# Patient Record
Sex: FEMALE | Race: WHITE | Employment: FULL TIME | URBAN - METROPOLITAN AREA
[De-identification: names, ages, dates, MRNs, and addresses within clinical notes are randomized per-mention and may not be internally consistent; named-entity substitution may affect disease eponyms.]

---

## 2017-08-29 ENCOUNTER — HOSPITAL ENCOUNTER (OUTPATIENT)
Age: 57
Discharge: OTHER INSTITUTION | End: 2017-08-29
Attending: FAMILY MEDICINE
Payer: COMMERCIAL

## 2017-08-29 ENCOUNTER — APPOINTMENT (OUTPATIENT)
Dept: GENERAL RADIOLOGY | Age: 57
End: 2017-08-29
Attending: FAMILY MEDICINE
Payer: COMMERCIAL

## 2017-08-29 VITALS
HEART RATE: 93 BPM | RESPIRATION RATE: 18 BRPM | DIASTOLIC BLOOD PRESSURE: 88 MMHG | OXYGEN SATURATION: 100 % | SYSTOLIC BLOOD PRESSURE: 165 MMHG | TEMPERATURE: 97 F

## 2017-08-29 DIAGNOSIS — R07.9 ACUTE CHEST PAIN: ICD-10-CM

## 2017-08-29 DIAGNOSIS — R94.31 ABNORMAL EKG: Primary | ICD-10-CM

## 2017-08-29 PROCEDURE — 99204 OFFICE O/P NEW MOD 45 MIN: CPT

## 2017-08-29 PROCEDURE — 93005 ELECTROCARDIOGRAM TRACING: CPT

## 2017-08-29 PROCEDURE — 93010 ELECTROCARDIOGRAM REPORT: CPT

## 2017-08-29 PROCEDURE — 93010 ELECTROCARDIOGRAM REPORT: CPT | Performed by: FAMILY MEDICINE

## 2017-08-29 PROCEDURE — 71020 XR CHEST PA + LAT CHEST (CPT=71020): CPT | Performed by: FAMILY MEDICINE

## 2017-08-29 RX ORDER — ASPIRIN 81 MG/1
324 TABLET, CHEWABLE ORAL ONCE
Status: COMPLETED | OUTPATIENT
Start: 2017-08-29 | End: 2017-08-29

## 2017-08-29 RX ORDER — DULOXETIN HYDROCHLORIDE 30 MG/1
20 CAPSULE, DELAYED RELEASE ORAL DAILY
COMMUNITY
End: 2017-12-19

## 2017-08-29 RX ORDER — LAMOTRIGINE 100 MG/1
100 TABLET ORAL DAILY
COMMUNITY
End: 2018-04-30 | Stop reason: DRUGHIGH

## 2017-08-29 NOTE — ED INITIAL ASSESSMENT (HPI)
Patient comes in complaining of difficulty breathing and pain in her chest. She is under a lot of stress at work with a family history of cardiovascular disease. She also has a history of anxiety.

## 2017-08-29 NOTE — ED NOTES
Patient in need of higher level of care. 911 phoned. Patient stable. She will be taken to [facility] by EMS.

## 2017-08-29 NOTE — ED PROVIDER NOTES
Patient Seen in: [address]    History   Patient presents with:  Chest Pain Angina (cardiovascular)    Stated Complaint: SOB; CHEST PAIN    HPI  61-year-old female with a past medical history significant for depression a is oriented to person, place, and time. She appears well-developed and well-nourished. She appears distressed. Eyes: Conjunctivae are normal. Pupils are equal, round, and reactive to light. Neck: Neck supple.    Cardiovascular: Normal rate, regular rhyt degenerative changes in thoracic spine.   OTHER: Negative.       Dictated by (CST): Patt Olszewski, MD on 8/29/2017 at 12:53       Approved by (CST): Patt Olszewski, MD on 8/29/2017 at 12:54                   Disposition and Plan     Clinical Impression:  MJ

## 2017-12-19 ENCOUNTER — OFFICE VISIT (OUTPATIENT)
Dept: FAMILY MEDICINE CLINIC | Facility: CLINIC | Age: 57
End: 2017-12-19

## 2017-12-19 VITALS
WEIGHT: 166 LBS | DIASTOLIC BLOOD PRESSURE: 70 MMHG | HEIGHT: 64 IN | HEART RATE: 89 BPM | SYSTOLIC BLOOD PRESSURE: 116 MMHG | BODY MASS INDEX: 28.34 KG/M2 | OXYGEN SATURATION: 99 %

## 2017-12-19 DIAGNOSIS — M99.00 SOMATIC DYSFUNCTION OF HEAD REGION: ICD-10-CM

## 2017-12-19 DIAGNOSIS — Z12.31 VISIT FOR SCREENING MAMMOGRAM: ICD-10-CM

## 2017-12-19 DIAGNOSIS — M54.2 CERVICAL PAIN (NECK): Primary | ICD-10-CM

## 2017-12-19 DIAGNOSIS — M25.572 CHRONIC PAIN OF LEFT ANKLE: ICD-10-CM

## 2017-12-19 DIAGNOSIS — M99.01 SOMATIC DYSFUNCTION OF CERVICAL REGION: ICD-10-CM

## 2017-12-19 DIAGNOSIS — G89.29 CHRONIC PAIN OF LEFT ANKLE: ICD-10-CM

## 2017-12-19 PROCEDURE — 99202 OFFICE O/P NEW SF 15 MIN: CPT | Performed by: FAMILY MEDICINE

## 2017-12-19 PROCEDURE — 98925 OSTEOPATH MANJ 1-2 REGIONS: CPT | Performed by: FAMILY MEDICINE

## 2017-12-19 RX ORDER — DULOXETIN HYDROCHLORIDE 20 MG/1
CAPSULE, DELAYED RELEASE ORAL
COMMUNITY
Start: 2017-12-04 | End: 2018-04-30 | Stop reason: DRUGHIGH

## 2017-12-19 NOTE — PROGRESS NOTES
CC:  Patient presents with:  New Patient  Ankle Pain: left ankle injured many year ago and gets pain since then on and off  Neck Pain: started after an accident about 4 years ago      HPI: 56year old female here to discuss chronic left ankle and neck pain Not on file    Drug use: Unknown     Other Topics Concern   None on file     Social History Narrative   None on file         Current Outpatient Prescriptions:  DULoxetine HCl 20 MG Oral Cap DR Particles  Disp:  Rfl:    lamoTRIgine 100 MG Oral Tab Take 100 Future    Harrison Sides, DO  12/19/17  10:13 AM

## 2017-12-20 NOTE — PROCEDURES
Osteopathic Manipulation Procedure Note     Head region:  Suboccipital release of head region performed with moderate improvement noted, no complications.     Cervical region:  Soft tissue of cervical paraspinal musculature performed with moderate improvem

## 2018-01-17 ENCOUNTER — MED REC SCAN ONLY (OUTPATIENT)
Dept: FAMILY MEDICINE CLINIC | Facility: CLINIC | Age: 58
End: 2018-01-17

## 2018-04-30 ENCOUNTER — OFFICE VISIT (OUTPATIENT)
Dept: FAMILY MEDICINE CLINIC | Facility: CLINIC | Age: 58
End: 2018-04-30

## 2018-04-30 VITALS
SYSTOLIC BLOOD PRESSURE: 122 MMHG | OXYGEN SATURATION: 98 % | WEIGHT: 156 LBS | HEART RATE: 77 BPM | HEIGHT: 64 IN | DIASTOLIC BLOOD PRESSURE: 68 MMHG | BODY MASS INDEX: 26.63 KG/M2

## 2018-04-30 DIAGNOSIS — K21.9 GASTROESOPHAGEAL REFLUX DISEASE, ESOPHAGITIS PRESENCE NOT SPECIFIED: ICD-10-CM

## 2018-04-30 DIAGNOSIS — R19.5 CHANGE IN STOOL CALIBER: ICD-10-CM

## 2018-04-30 DIAGNOSIS — R14.0 ABDOMINAL BLOATING: Primary | ICD-10-CM

## 2018-04-30 DIAGNOSIS — R09.81 NASAL CONGESTION: ICD-10-CM

## 2018-04-30 PROBLEM — G47.33 OBSTRUCTIVE SLEEP APNEA: Status: ACTIVE | Noted: 2018-04-30

## 2018-04-30 PROCEDURE — 99214 OFFICE O/P EST MOD 30 MIN: CPT | Performed by: FAMILY MEDICINE

## 2018-04-30 RX ORDER — FLUTICASONE PROPIONATE 50 MCG
2 SPRAY, SUSPENSION (ML) NASAL DAILY
Qty: 1 INHALER | Refills: 3 | Status: SHIPPED | OUTPATIENT
Start: 2018-04-30 | End: 2019-04-11

## 2018-04-30 RX ORDER — DULOXETIN HYDROCHLORIDE 30 MG/1
10 CAPSULE, DELAYED RELEASE ORAL DAILY
COMMUNITY
Start: 2018-04-07

## 2018-04-30 RX ORDER — LAMOTRIGINE 150 MG/1
150 TABLET ORAL DAILY
COMMUNITY
Start: 2018-04-07

## 2018-04-30 NOTE — PROGRESS NOTES
CC:  Patient presents with:   Other: OMT-for her back and neck   Fibroid: states she would like to check on her fibroid- having bloating and digestive problems which she had in the past due to the size of her fibroid  Nasal Congestion: wakes up every mornin diarrhea, acid reflux, no abdominal pain but abdominal bloating and fullness, gas, no hematochezia or melena, thinner stools   :  No discharge, no dysuria, polyuria, no hematuria, no vaginal bleeding   Endo: polyuria and polydipsia   Dermatologic:  No ra lesions    Assessment and Plan: 56year old female here with concerns for abdominal bloating/fullness, nasal congestion, and worsening reflux.     1. Abdominal bloating    - Will obtain pelvic ultrasound for further evaluation of possible uterine or ovarian

## 2018-04-30 NOTE — PATIENT INSTRUCTIONS
Step-by-Step  Using Nasal Spray    Date Last Reviewed: 5/27/2015  © 8980-4885 [employer]. [address] [facility], [address] [place] [address]. All rights reserved. This information is not intended as a substitute for professional medical care.  [place]

## 2018-05-02 ENCOUNTER — HOSPITAL ENCOUNTER (OUTPATIENT)
Dept: ULTRASOUND IMAGING | Age: 58
Discharge: HOME | End: 2018-05-02
Attending: FAMILY MEDICINE
Payer: COMMERCIAL

## 2018-05-02 DIAGNOSIS — R14.0 ABDOMINAL BLOATING: ICD-10-CM

## 2018-05-02 PROCEDURE — 76856 US EXAM PELVIC COMPLETE: CPT | Performed by: FAMILY MEDICINE

## 2018-05-02 PROCEDURE — 76830 TRANSVAGINAL US NON-OB: CPT | Performed by: FAMILY MEDICINE

## 2019-04-09 ENCOUNTER — TELEPHONE (OUTPATIENT)
Dept: FAMILY MEDICINE CLINIC | Facility: CLINIC | Age: 59
End: 2019-04-09

## 2019-04-09 DIAGNOSIS — Z12.39 SCREENING BREAST EXAMINATION: Primary | ICD-10-CM

## 2019-04-11 ENCOUNTER — OFFICE VISIT (OUTPATIENT)
Dept: FAMILY MEDICINE CLINIC | Facility: CLINIC | Age: 59
End: 2019-04-11
Payer: COMMERCIAL

## 2019-04-11 VITALS
WEIGHT: 151.63 LBS | HEIGHT: 64 IN | BODY MASS INDEX: 25.89 KG/M2 | OXYGEN SATURATION: 98 % | HEART RATE: 74 BPM | SYSTOLIC BLOOD PRESSURE: 118 MMHG | DIASTOLIC BLOOD PRESSURE: 62 MMHG

## 2019-04-11 DIAGNOSIS — D22.9 MULTIPLE NEVI: ICD-10-CM

## 2019-04-11 DIAGNOSIS — Z12.31 SCREENING MAMMOGRAM, ENCOUNTER FOR: ICD-10-CM

## 2019-04-11 DIAGNOSIS — Z00.00 ENCOUNTER FOR ROUTINE ADULT HEALTH EXAMINATION WITHOUT ABNORMAL FINDINGS: Primary | ICD-10-CM

## 2019-04-11 DIAGNOSIS — Z11.59 ENCOUNTER FOR HEPATITIS C SCREENING TEST FOR LOW RISK PATIENT: ICD-10-CM

## 2019-04-11 PROCEDURE — 99396 PREV VISIT EST AGE 40-64: CPT | Performed by: FAMILY MEDICINE

## 2019-04-11 NOTE — PROGRESS NOTES
HPI:   Domenico Mendez is a 66year old female who presents for a complete physical exam.     Last pap: 5/2017 and normal   Last mammogram: Uncertain but thinks it was 4 years ago and normal. Told to do both mammogram and ultrasound due to density of her Procedure Laterality Date   • ARTHROTOMY,OPEN REPAIR MENISCUS Right    • MOHS - REFERRAL      For basal cell      Family History   Problem Relation Age of Onset   • Stroke Mother    • Heart Attack Father 52   • Breast Cancer Sister    • Diabetes Maternal (Glycohemoglobin) [E]    Will see dermatology for routine skin check given history of basal cell carcinoma, multiple nevi, and family history of skin cancer, though no abnormal appearing moles today.      Discussed with patient the following:  -Zena spaulding

## 2019-04-11 NOTE — PATIENT INSTRUCTIONS
Prevention Guidelines, Women Ages 48 to 59  Screening tests and vaccines are an important part of managing your health. A screening test is done to find possible disorders or diseases in people who don't have any symptoms.  The goal is to find a disease e Chlamydia Women at increased risk for infection At routine exams   Colorectal cancer All women in this age group Flexible sigmoidoscopy every 5 years, or colonoscopy every 10 years, or double-contrast barium enema every 5 years; yearly fecal occult blood t Hepatitis B Women at increased risk for infection – talk with your healthcare provider 3 doses over 6 months; second dose should be given 1 month after the first dose; the third dose should be given at least 2 months after the second dose and at least 4 mo Use of daily aspirin Women ages 54 and up in this age group who are at risk for cardiovascular health problems such as stroke When your risk is known   Use of tobacco and the health effects it can cause All women in this age group Every exam   1[employer]merpr[employer] Ca

## 2019-04-12 ENCOUNTER — HOSPITAL ENCOUNTER (OUTPATIENT)
Dept: MAMMOGRAPHY | Age: 59
Discharge: HOME | End: 2019-04-12
Attending: FAMILY MEDICINE
Payer: COMMERCIAL

## 2019-04-12 ENCOUNTER — LAB ENCOUNTER (OUTPATIENT)
Dept: LAB | Facility: REFERENCE LAB | Age: 59
End: 2019-04-12
Attending: FAMILY MEDICINE
Payer: COMMERCIAL

## 2019-04-12 DIAGNOSIS — Z12.31 SCREENING MAMMOGRAM, ENCOUNTER FOR: ICD-10-CM

## 2019-04-12 DIAGNOSIS — Z11.59 ENCOUNTER FOR HEPATITIS C SCREENING TEST FOR LOW RISK PATIENT: ICD-10-CM

## 2019-04-12 DIAGNOSIS — Z00.00 ENCOUNTER FOR ROUTINE ADULT HEALTH EXAMINATION WITHOUT ABNORMAL FINDINGS: ICD-10-CM

## 2019-04-12 PROCEDURE — 80053 COMPREHEN METABOLIC PANEL: CPT

## 2019-04-12 PROCEDURE — 80061 LIPID PANEL: CPT

## 2019-04-12 PROCEDURE — 77063 BREAST TOMOSYNTHESIS BI: CPT | Performed by: FAMILY MEDICINE

## 2019-04-12 PROCEDURE — 83036 HEMOGLOBIN GLYCOSYLATED A1C: CPT

## 2019-04-12 PROCEDURE — 86803 HEPATITIS C AB TEST: CPT

## 2019-04-12 PROCEDURE — 85025 COMPLETE CBC W/AUTO DIFF WBC: CPT

## 2019-04-12 PROCEDURE — 77067 SCR MAMMO BI INCL CAD: CPT | Performed by: FAMILY MEDICINE

## 2019-04-12 PROCEDURE — 36415 COLL VENOUS BLD VENIPUNCTURE: CPT

## 2019-04-13 DIAGNOSIS — D72.819 LEUKOPENIA, UNSPECIFIED TYPE: Primary | ICD-10-CM

## 2019-05-02 ENCOUNTER — HOSPITAL ENCOUNTER (OUTPATIENT)
Dept: ULTRASOUND IMAGING | Facility: HOSPITAL | Age: 59
Discharge: HOME | End: 2019-05-02
Attending: FAMILY MEDICINE
Payer: COMMERCIAL

## 2019-05-02 ENCOUNTER — HOSPITAL ENCOUNTER (OUTPATIENT)
Dept: MAMMOGRAPHY | Facility: HOSPITAL | Age: 59
Discharge: HOME | End: 2019-05-02
Attending: FAMILY MEDICINE
Payer: COMMERCIAL

## 2019-05-02 DIAGNOSIS — R92.8 ABNORMAL MAMMOGRAM: ICD-10-CM

## 2019-05-02 DIAGNOSIS — R92.8 ABNORMAL MAMMOGRAM: Primary | ICD-10-CM

## 2019-05-02 PROCEDURE — 77062 BREAST TOMOSYNTHESIS BI: CPT | Performed by: FAMILY MEDICINE

## 2019-05-02 PROCEDURE — 77066 DX MAMMO INCL CAD BI: CPT | Performed by: FAMILY MEDICINE

## 2019-05-02 PROCEDURE — 76642 ULTRASOUND BREAST LIMITED: CPT | Performed by: FAMILY MEDICINE

## 2019-09-25 ENCOUNTER — PATIENT MESSAGE (OUTPATIENT)
Dept: FAMILY MEDICINE CLINIC | Facility: CLINIC | Age: 59
End: 2019-09-25

## 2019-09-25 DIAGNOSIS — E55.9 VITAMIN D DEFICIENCY: Primary | ICD-10-CM

## 2019-09-27 NOTE — TELEPHONE ENCOUNTER
From: Edith Mancera  To: Francisca Britton DO  Sent: 9/25/2019 12:13 PM CDT  Subject: Non-Urgent Medical Question    Hi again,     I am supposed to come back to get my white blood cell count checked, per Dr. Elly Pena.  I am wondering if I can at the same time ge

## 2020-07-14 ENCOUNTER — TELEPHONE (OUTPATIENT)
Dept: FAMILY MEDICINE CLINIC | Facility: CLINIC | Age: 60
End: 2020-07-14

## 2020-07-14 NOTE — TELEPHONE ENCOUNTER
Pt is needing refills and she also has questions about her mammogram     DULoxetine HCl 30 MG Oral Cap DR Particles      lamoTRIgine 150 MG Oral Tab    [id number] [place], [place] - [facility] [address] AT [address] [place] [address], [phone],

## 2020-07-14 NOTE — TELEPHONE ENCOUNTER
Left voice message to call office to schedule an appointment due Dr. Mary Collier on maternity leave and Dr. Pineda/ [facility] needing to see pt before providing refills.     Last visit 04/11/2019 AS     Return in about 1 year (around 4/11/2020), or if symptoms worse

## 2020-07-15 ENCOUNTER — OFFICE VISIT (OUTPATIENT)
Dept: FAMILY MEDICINE CLINIC | Facility: CLINIC | Age: 60
End: 2020-07-15
Payer: COMMERCIAL

## 2020-07-15 VITALS
DIASTOLIC BLOOD PRESSURE: 74 MMHG | HEIGHT: 64 IN | WEIGHT: 151 LBS | OXYGEN SATURATION: 98 % | HEART RATE: 84 BPM | TEMPERATURE: 98 F | SYSTOLIC BLOOD PRESSURE: 120 MMHG | BODY MASS INDEX: 25.78 KG/M2

## 2020-07-15 DIAGNOSIS — Z00.00 PHYSICAL EXAM, ANNUAL: Primary | ICD-10-CM

## 2020-07-15 DIAGNOSIS — D25.9 UTERINE LEIOMYOMA, UNSPECIFIED LOCATION: ICD-10-CM

## 2020-07-15 DIAGNOSIS — Z13.1 DIABETES MELLITUS SCREENING: ICD-10-CM

## 2020-07-15 DIAGNOSIS — Z12.4 PAP SMEAR FOR CERVICAL CANCER SCREENING: ICD-10-CM

## 2020-07-15 DIAGNOSIS — N63.20 LEFT BREAST MASS: ICD-10-CM

## 2020-07-15 DIAGNOSIS — E78.00 PURE HYPERCHOLESTEROLEMIA: ICD-10-CM

## 2020-07-15 PROCEDURE — 87624 HPV HI-RISK TYP POOLED RSLT: CPT | Performed by: FAMILY MEDICINE

## 2020-07-15 PROCEDURE — 3008F BODY MASS INDEX DOCD: CPT | Performed by: FAMILY MEDICINE

## 2020-07-15 PROCEDURE — 99396 PREV VISIT EST AGE 40-64: CPT | Performed by: FAMILY MEDICINE

## 2020-07-15 PROCEDURE — 88175 CYTOPATH C/V AUTO FLUID REDO: CPT | Performed by: FAMILY MEDICINE

## 2020-07-15 PROCEDURE — 3074F SYST BP LT 130 MM HG: CPT | Performed by: FAMILY MEDICINE

## 2020-07-15 PROCEDURE — 3078F DIAST BP <80 MM HG: CPT | Performed by: FAMILY MEDICINE

## 2020-07-15 NOTE — PROGRESS NOTES
HPI:   Fabiola Kay is a 64year old female who presents for a complete physical exam and pelvic protrusion. Feels protrusion of entire lower pelvic area  Has hx of fibroids. Fibroids run in family. No pain. No GI or  symptoms.   LMP was about Depression    • Fibroid    • RAFAEL (obstructive sleep apnea)    • Shingles    • Sigmoiditis     Right foot      Past Surgical History:   Procedure Laterality Date   • ARTHROTOMY,OPEN REPAIR MENISCUS Right    • MOHS - REFERRAL      For basal cell      Family ASSESSMENT AND PLAN:   Ava Eddy is a 64year old female who presents for a complete physical exam.  Physical exam, annual  (primary encounter diagnosis)  Pap smear for cervical cancer screening  Left breast mass  Uterine leiomyoma, unspecified lo BILAT (CPT=77066/96135)  US BREAST LEFT COMPLETE (FRM=23930)  US PELVIS (TRANSVAGINAL PELVIS) (QWN=36028)    Shraddha Zamudio DO  7/15/2020  2:05 PM

## 2020-07-17 LAB — HPV I/H RISK 1 DNA SPEC QL NAA+PROBE: NEGATIVE

## 2020-09-04 ENCOUNTER — TELEPHONE (OUTPATIENT)
Dept: FAMILY MEDICINE CLINIC | Facility: CLINIC | Age: 60
End: 2020-09-04

## 2020-09-04 NOTE — TELEPHONE ENCOUNTER
Pt had LOV with MP on 7/15/2020; pt denies any symptoms but believes she had covid19 a few weeks ago and would like to get tested for covid19 antibodies. Request sent to [facility]. Pt to expect call back tomorrow or Tuesday.  Pt verbalized understanding and agrees

## 2020-09-09 NOTE — TELEPHONE ENCOUNTER
Called and LVM for third time. Encouraged patient to schedule appt to discuss utility of antibody testing.

## 2020-10-16 ENCOUNTER — OFFICE VISIT (OUTPATIENT)
Dept: FAMILY MEDICINE CLINIC | Facility: CLINIC | Age: 60
End: 2020-10-16
Payer: COMMERCIAL

## 2020-10-16 VITALS
HEART RATE: 84 BPM | WEIGHT: 151 LBS | HEIGHT: 64 IN | OXYGEN SATURATION: 98 % | DIASTOLIC BLOOD PRESSURE: 76 MMHG | BODY MASS INDEX: 25.78 KG/M2 | SYSTOLIC BLOOD PRESSURE: 120 MMHG | TEMPERATURE: 97 F

## 2020-10-16 DIAGNOSIS — R41.840 INATTENTION: ICD-10-CM

## 2020-10-16 DIAGNOSIS — Z20.822 ENCOUNTER FOR LABORATORY TESTING FOR COVID-19 VIRUS: Primary | ICD-10-CM

## 2020-10-16 DIAGNOSIS — R41.89 BRAIN FOG: ICD-10-CM

## 2020-10-16 DIAGNOSIS — M25.50 ARTHRALGIA, UNSPECIFIED JOINT: ICD-10-CM

## 2020-10-16 PROCEDURE — 3078F DIAST BP <80 MM HG: CPT | Performed by: FAMILY MEDICINE

## 2020-10-16 PROCEDURE — 90686 IIV4 VACC NO PRSV 0.5 ML IM: CPT | Performed by: FAMILY MEDICINE

## 2020-10-16 PROCEDURE — 90471 IMMUNIZATION ADMIN: CPT | Performed by: FAMILY MEDICINE

## 2020-10-16 PROCEDURE — 3008F BODY MASS INDEX DOCD: CPT | Performed by: FAMILY MEDICINE

## 2020-10-16 PROCEDURE — 3074F SYST BP LT 130 MM HG: CPT | Performed by: FAMILY MEDICINE

## 2020-10-16 PROCEDURE — 99214 OFFICE O/P EST MOD 30 MIN: CPT | Performed by: FAMILY MEDICINE

## 2020-10-16 NOTE — PROGRESS NOTES
HPI:    Patient ID: Ava Sender is a 64year old female who presents for few concerns. HPI  Would like covid Ab testing. Thinks she had covid in early April. Takes lamictal and cymbalta for bipolar II.    Recently has been having a hard time co Head: Normocephalic and atraumatic. Eyes: Conjunctivae and EOM are normal. Right eye exhibits no discharge. Left eye exhibits no discharge. No scleral icterus. Neck: Neck supple. No JVD present. No thyromegaly present.    Cardiovascular: Normal rate,

## 2020-10-19 ENCOUNTER — LAB ENCOUNTER (OUTPATIENT)
Dept: LAB | Age: 60
End: 2020-10-19
Attending: FAMILY MEDICINE
Payer: COMMERCIAL

## 2020-10-19 DIAGNOSIS — M25.50 ARTHRALGIA, UNSPECIFIED JOINT: ICD-10-CM

## 2020-10-19 DIAGNOSIS — Z20.822 ENCOUNTER FOR LABORATORY TESTING FOR COVID-19 VIRUS: ICD-10-CM

## 2020-10-19 PROCEDURE — 85027 COMPLETE CBC AUTOMATED: CPT | Performed by: FAMILY MEDICINE

## 2020-10-19 PROCEDURE — 86769 SARS-COV-2 COVID-19 ANTIBODY: CPT

## 2020-10-19 PROCEDURE — 80053 COMPREHEN METABOLIC PANEL: CPT | Performed by: FAMILY MEDICINE

## 2020-10-19 PROCEDURE — 85652 RBC SED RATE AUTOMATED: CPT

## 2020-10-19 PROCEDURE — 36415 COLL VENOUS BLD VENIPUNCTURE: CPT | Performed by: FAMILY MEDICINE

## 2020-10-19 PROCEDURE — 80061 LIPID PANEL: CPT | Performed by: FAMILY MEDICINE

## 2020-10-19 PROCEDURE — 82306 VITAMIN D 25 HYDROXY: CPT | Performed by: FAMILY MEDICINE

## 2020-10-19 PROCEDURE — 83036 HEMOGLOBIN GLYCOSYLATED A1C: CPT | Performed by: FAMILY MEDICINE

## 2020-10-19 PROCEDURE — 86140 C-REACTIVE PROTEIN: CPT

## 2020-10-22 DIAGNOSIS — E55.9 VITAMIN D DEFICIENCY: Primary | ICD-10-CM

## 2020-10-22 PROBLEM — E78.2 MIXED HYPERLIPIDEMIA: Status: ACTIVE | Noted: 2020-10-22

## 2020-11-05 ENCOUNTER — LAB ENCOUNTER (OUTPATIENT)
Dept: LAB | Age: 60
End: 2020-11-05
Attending: FAMILY MEDICINE
Payer: COMMERCIAL

## 2020-11-05 DIAGNOSIS — R41.89 BRAIN FOG: ICD-10-CM

## 2020-11-05 PROCEDURE — 82607 VITAMIN B-12: CPT

## 2020-11-05 PROCEDURE — 36415 COLL VENOUS BLD VENIPUNCTURE: CPT

## 2020-11-05 PROCEDURE — 82746 ASSAY OF FOLIC ACID SERUM: CPT

## 2020-11-16 ENCOUNTER — OFFICE VISIT (OUTPATIENT)
Dept: FAMILY MEDICINE CLINIC | Facility: CLINIC | Age: 60
End: 2020-11-16
Payer: COMMERCIAL

## 2020-11-16 VITALS
DIASTOLIC BLOOD PRESSURE: 62 MMHG | BODY MASS INDEX: 26.12 KG/M2 | OXYGEN SATURATION: 98 % | HEIGHT: 64 IN | SYSTOLIC BLOOD PRESSURE: 118 MMHG | HEART RATE: 75 BPM | WEIGHT: 153 LBS

## 2020-11-16 DIAGNOSIS — M99.02 SOMATIC DYSFUNCTION OF THORACIC REGION: ICD-10-CM

## 2020-11-16 DIAGNOSIS — M99.01 SOMATIC DYSFUNCTION OF CERVICAL REGION: ICD-10-CM

## 2020-11-16 DIAGNOSIS — M99.00 SOMATIC DYSFUNCTION OF HEAD REGION: Primary | ICD-10-CM

## 2020-11-16 DIAGNOSIS — M99.07 SEGMENTAL DYSFUNCTION OF UPPER EXTREMITY: ICD-10-CM

## 2020-11-16 PROCEDURE — 3008F BODY MASS INDEX DOCD: CPT | Performed by: FAMILY MEDICINE

## 2020-11-16 PROCEDURE — 3074F SYST BP LT 130 MM HG: CPT | Performed by: FAMILY MEDICINE

## 2020-11-16 PROCEDURE — 3078F DIAST BP <80 MM HG: CPT | Performed by: FAMILY MEDICINE

## 2020-11-16 PROCEDURE — 98926 OSTEOPATH MANJ 3-4 REGIONS: CPT | Performed by: FAMILY MEDICINE

## 2020-11-16 NOTE — PROGRESS NOTES
CC:  Patient presents with:  Neck Pain: needs OMT      HPI: 64year old female here for OMT of her neck, shoulders, and feet.    Reports her neck has been really locked up and she fell a few weeks ago after tripping on the sidewalk and landed on her right e Attends [employer] service: Not on file        Active member of club or organization: Not on file        Attends meetings of clubs or organizations: Not on file        Relationship status: Not on file      Intimate partner violence        Fear of curre DO Anastacio  11/16/20  1:49 PM

## 2020-11-17 NOTE — PROCEDURES
Osteopathic Manipulation Procedure Note     Head region:  Suboccipital release of head region performed with improvement noted, no complications.     Cervical region:  Soft tissue of cervical paraspinal musculature performed with moderate improvement noted

## 2021-07-12 ENCOUNTER — OFFICE VISIT (OUTPATIENT)
Dept: FAMILY MEDICINE CLINIC | Facility: CLINIC | Age: 61
End: 2021-07-12
Payer: COMMERCIAL

## 2021-07-12 ENCOUNTER — LAB ENCOUNTER (OUTPATIENT)
Dept: LAB | Facility: REFERENCE LAB | Age: 61
End: 2021-07-12
Attending: FAMILY MEDICINE
Payer: COMMERCIAL

## 2021-07-12 VITALS
BODY MASS INDEX: 26.12 KG/M2 | WEIGHT: 153 LBS | HEART RATE: 100 BPM | SYSTOLIC BLOOD PRESSURE: 102 MMHG | HEIGHT: 64 IN | OXYGEN SATURATION: 98 % | DIASTOLIC BLOOD PRESSURE: 64 MMHG

## 2021-07-12 DIAGNOSIS — E78.5 DYSLIPIDEMIA: ICD-10-CM

## 2021-07-12 DIAGNOSIS — D25.9 UTERINE LEIOMYOMA, UNSPECIFIED LOCATION: ICD-10-CM

## 2021-07-12 DIAGNOSIS — G89.29 CHRONIC PAIN OF RIGHT KNEE: Primary | ICD-10-CM

## 2021-07-12 DIAGNOSIS — E55.9 VITAMIN D DEFICIENCY: ICD-10-CM

## 2021-07-12 DIAGNOSIS — Q66.70 HIGH ARCHES: ICD-10-CM

## 2021-07-12 DIAGNOSIS — R73.09 ELEVATED HEMOGLOBIN A1C: ICD-10-CM

## 2021-07-12 DIAGNOSIS — N63.20 LEFT BREAST MASS: ICD-10-CM

## 2021-07-12 DIAGNOSIS — M25.561 CHRONIC PAIN OF RIGHT KNEE: Primary | ICD-10-CM

## 2021-07-12 LAB
ALBUMIN SERPL-MCNC: 3.5 G/DL (ref 3.4–5)
ALBUMIN/GLOB SERPL: 1 {RATIO} (ref 1–2)
ALP LIVER SERPL-CCNC: 77 U/L
ALT SERPL-CCNC: 22 U/L
ANION GAP SERPL CALC-SCNC: 4 MMOL/L (ref 0–18)
AST SERPL-CCNC: 13 U/L (ref 15–37)
BILIRUB SERPL-MCNC: 0.4 MG/DL (ref 0.1–2)
BUN BLD-MCNC: 15 MG/DL (ref 7–18)
BUN/CREAT SERPL: 20.3 (ref 10–20)
CALCIUM BLD-MCNC: 8.6 MG/DL (ref 8.5–10.1)
CHLORIDE SERPL-SCNC: 108 MMOL/L (ref 98–112)
CHOLEST SMN-MCNC: 248 MG/DL (ref ?–200)
CO2 SERPL-SCNC: 29 MMOL/L (ref 21–32)
CREAT BLD-MCNC: 0.74 MG/DL
EST. AVERAGE GLUCOSE BLD GHB EST-MCNC: 120 MG/DL (ref 68–126)
GLOBULIN PLAS-MCNC: 3.5 G/DL (ref 2.8–4.4)
GLUCOSE BLD-MCNC: 95 MG/DL (ref 70–99)
HBA1C MFR BLD HPLC: 5.8 % (ref ?–5.7)
HDLC SERPL-MCNC: 86 MG/DL (ref 40–59)
LDLC SERPL CALC-MCNC: 152 MG/DL (ref ?–100)
M PROTEIN MFR SERPL ELPH: 7 G/DL (ref 6.4–8.2)
NONHDLC SERPL-MCNC: 162 MG/DL (ref ?–130)
OSMOLALITY SERPL CALC.SUM OF ELEC: 293 MOSM/KG (ref 275–295)
PATIENT FASTING Y/N/NP: YES
PATIENT FASTING Y/N/NP: YES
POTASSIUM SERPL-SCNC: 3.8 MMOL/L (ref 3.5–5.1)
SODIUM SERPL-SCNC: 141 MMOL/L (ref 136–145)
TRIGL SERPL-MCNC: 60 MG/DL (ref 30–149)
VLDLC SERPL CALC-MCNC: 11 MG/DL (ref 0–30)

## 2021-07-12 PROCEDURE — 3008F BODY MASS INDEX DOCD: CPT | Performed by: FAMILY MEDICINE

## 2021-07-12 PROCEDURE — 3078F DIAST BP <80 MM HG: CPT | Performed by: FAMILY MEDICINE

## 2021-07-12 PROCEDURE — 83036 HEMOGLOBIN GLYCOSYLATED A1C: CPT | Performed by: FAMILY MEDICINE

## 2021-07-12 PROCEDURE — 80061 LIPID PANEL: CPT | Performed by: FAMILY MEDICINE

## 2021-07-12 PROCEDURE — 36415 COLL VENOUS BLD VENIPUNCTURE: CPT | Performed by: FAMILY MEDICINE

## 2021-07-12 PROCEDURE — 80053 COMPREHEN METABOLIC PANEL: CPT | Performed by: FAMILY MEDICINE

## 2021-07-12 PROCEDURE — 82306 VITAMIN D 25 HYDROXY: CPT

## 2021-07-12 PROCEDURE — 99214 OFFICE O/P EST MOD 30 MIN: CPT | Performed by: FAMILY MEDICINE

## 2021-07-12 PROCEDURE — 3074F SYST BP LT 130 MM HG: CPT | Performed by: FAMILY MEDICINE

## 2021-07-12 NOTE — PROGRESS NOTES
HPI:    Patient ID: Natalio Haque is a 64year old female who presents for few concerns. HPI  Right knee pain:   Has high arches in feet and has a hard time finding comfortable shoes. Loves to hike and walk .   Gets sore feet, and right knee pain do and Rhythm: Normal rate and regular rhythm. Heart sounds: Normal heart sounds. No murmur heard. No friction rub. No gallop. Pulmonary:      Effort: Pulmonary effort is normal. No respiratory distress. Breath sounds: Normal breath sounds.  No deficiency  - VITAMIN D; Future  -Due for repeat. 6. Left breast mass  - US BREAST LEFT COMPLETE (TNT=74849); Future  - [facility] FLORIDALMA 2D+3D DIAGNOSTIC [facility]  BILAT (CPT=77066/84971); Future  -Never completed imaging last year. Reordered.      7. Uterine leiomy

## 2021-07-14 LAB — 25(OH)D3 SERPL-MCNC: 33.2 NG/ML (ref 30–100)

## 2022-05-10 ENCOUNTER — OFFICE VISIT (OUTPATIENT)
Dept: FAMILY MEDICINE CLINIC | Facility: CLINIC | Age: 62
End: 2022-05-10
Payer: COMMERCIAL

## 2022-05-10 VITALS
HEIGHT: 64 IN | WEIGHT: 149 LBS | OXYGEN SATURATION: 98 % | DIASTOLIC BLOOD PRESSURE: 70 MMHG | HEART RATE: 85 BPM | SYSTOLIC BLOOD PRESSURE: 116 MMHG | BODY MASS INDEX: 25.44 KG/M2

## 2022-05-10 DIAGNOSIS — U09.9 POST-COVID SYNDROME: ICD-10-CM

## 2022-05-10 DIAGNOSIS — Z12.31 SCREENING MAMMOGRAM, ENCOUNTER FOR: ICD-10-CM

## 2022-05-10 DIAGNOSIS — G89.29 CHRONIC RIGHT HIP PAIN: Primary | ICD-10-CM

## 2022-05-10 DIAGNOSIS — R92.8 ABNORMAL MAMMOGRAM OF LEFT BREAST: ICD-10-CM

## 2022-05-10 DIAGNOSIS — M25.551 CHRONIC RIGHT HIP PAIN: Primary | ICD-10-CM

## 2022-05-10 DIAGNOSIS — M25.561 ACUTE PAIN OF RIGHT KNEE: ICD-10-CM

## 2022-05-10 DIAGNOSIS — M79.671 RIGHT FOOT PAIN: ICD-10-CM

## 2022-05-10 DIAGNOSIS — G47.33 OBSTRUCTIVE SLEEP APNEA: ICD-10-CM

## 2022-05-10 PROCEDURE — 99214 OFFICE O/P EST MOD 30 MIN: CPT | Performed by: FAMILY MEDICINE

## 2022-05-10 PROCEDURE — 3074F SYST BP LT 130 MM HG: CPT | Performed by: FAMILY MEDICINE

## 2022-05-10 PROCEDURE — 3008F BODY MASS INDEX DOCD: CPT | Performed by: FAMILY MEDICINE

## 2022-05-10 PROCEDURE — 3078F DIAST BP <80 MM HG: CPT | Performed by: FAMILY MEDICINE

## 2022-06-08 ENCOUNTER — MED REC SCAN ONLY (OUTPATIENT)
Dept: FAMILY MEDICINE CLINIC | Facility: CLINIC | Age: 62
End: 2022-06-08

## 2022-06-14 ENCOUNTER — HOSPITAL ENCOUNTER (OUTPATIENT)
Dept: MAMMOGRAPHY | Facility: HOSPITAL | Age: 62
Discharge: HOME | End: 2022-06-14
Attending: FAMILY MEDICINE
Payer: COMMERCIAL

## 2022-06-14 ENCOUNTER — HOSPITAL ENCOUNTER (OUTPATIENT)
Dept: ULTRASOUND IMAGING | Facility: HOSPITAL | Age: 62
Discharge: HOME | End: 2022-06-14
Attending: FAMILY MEDICINE
Payer: COMMERCIAL

## 2022-06-14 DIAGNOSIS — N63.20 LEFT BREAST MASS: ICD-10-CM

## 2022-06-14 DIAGNOSIS — Z12.31 SCREENING MAMMOGRAM, ENCOUNTER FOR: ICD-10-CM

## 2022-06-14 PROCEDURE — 77066 DX MAMMO INCL CAD BI: CPT | Performed by: FAMILY MEDICINE

## 2022-06-14 PROCEDURE — 76642 ULTRASOUND BREAST LIMITED: CPT | Performed by: FAMILY MEDICINE

## 2022-06-14 PROCEDURE — 77062 BREAST TOMOSYNTHESIS BI: CPT | Performed by: FAMILY MEDICINE

## 2023-05-15 ENCOUNTER — PATIENT MESSAGE (OUTPATIENT)
Facility: CLINIC | Age: 63
End: 2023-05-15

## 2023-05-15 NOTE — TELEPHONE ENCOUNTER
From: Angel Warner  To: Marbella Hilton,   Sent: 5/15/2023 10:20 AM CDT  Subject: Knee    Hi Dr. Rita Cardenas,    I still have not made an appointment, that is next on my list... : )    I have a question about my knee. Last time I visited, you looked at my knee and were not able to determine the issue. What I can't remember is if you suggested that I get an x-ray (you did mention that MRI is only for surgery planning) and then go to a PT who can evaluate it. I had surgery on my knee for a torn meniscus a long time ago - and have had problems with it on and off for the last two years and now it seems to be mostly on. .. Is there a sports medicine doc I should go to first, or shall I do an X-ray and then go to a PT? Thanks for your help and any kind of referrals!      Nella Carias

## 2023-10-13 ENCOUNTER — NURSE TRIAGE (OUTPATIENT)
Facility: CLINIC | Age: 63
End: 2023-10-13

## 2023-10-13 ENCOUNTER — HOSPITAL ENCOUNTER (OUTPATIENT)
Age: 63
Discharge: HOME | End: 2023-10-13
Payer: COMMERCIAL

## 2023-10-13 VITALS
HEART RATE: 64 BPM | DIASTOLIC BLOOD PRESSURE: 63 MMHG | OXYGEN SATURATION: 98 % | RESPIRATION RATE: 20 BRPM | SYSTOLIC BLOOD PRESSURE: 115 MMHG | TEMPERATURE: 98 F

## 2023-10-13 DIAGNOSIS — S05.02XA ABRASION OF LEFT CORNEA, INITIAL ENCOUNTER: Primary | ICD-10-CM

## 2023-10-13 RX ORDER — PURIFIED WATER 986 MG/ML
10 SOLUTION OPHTHALMIC ONCE
Status: COMPLETED | OUTPATIENT
Start: 2023-10-13 | End: 2023-10-13

## 2023-10-13 RX ORDER — OFLOXACIN 3 MG/ML
2 SOLUTION/ DROPS OPHTHALMIC 4 TIMES DAILY
Qty: 10 ML | Refills: 0 | Status: SHIPPED | OUTPATIENT
Start: 2023-10-13 | End: 2023-10-18

## 2023-10-13 RX ORDER — TETRACAINE HYDROCHLORIDE 5 MG/ML
2 SOLUTION OPHTHALMIC ONCE
Status: COMPLETED | OUTPATIENT
Start: 2023-10-13 | End: 2023-10-13

## 2023-10-13 NOTE — TELEPHONE ENCOUNTER
Pt stated that she had a  allergic reaction to a beer so her eyes got really itchy so she rubbed her eye hard and now pt thinks she scratched her corneal. Per pt her eye feels really dry like there is something there and there is some swelling. Pt requesting some abx eye drops. Pt was inform she needs to be evaluated. Pt was inform I would call Ophthalmologist to see if they can see her pt then wanted to know where the ophthalmology office was, I did inform her it was at the [facility]. Pt stated that [place] lives in [place] so [place] like [place].        Reason for Disposition   MODERATE eye pain or discomfort (e.g., interferes with normal activities or awakens from sleep; more than mild)    Protocols used: Eye Pain and Other Symptoms-A-OH

## 2023-10-13 NOTE — ED INITIAL ASSESSMENT (HPI)
Pt states concern for an abrasion in her left eye. Pt states last night was rubbing her eye. Pt states had some tearing this morning.

## 2023-10-13 NOTE — DISCHARGE INSTRUCTIONS
Start the antibiotic drops as prescribed. You may try over-the-counter Pataday eyedrops to help with eye itchiness. If you develop entire outer eye swelling redness or warmth unable to open the eye feel like you are having blurry vision double vision or vision changes or feel like you are losing your vision severe headache fever nausea or vomiting or any new or worsening symptoms go to the nearest emergency department.

## 2023-11-07 ENCOUNTER — OFFICE VISIT (OUTPATIENT)
Facility: CLINIC | Age: 63
End: 2023-11-07
Payer: COMMERCIAL

## 2023-11-07 ENCOUNTER — NURSE TRIAGE (OUTPATIENT)
Facility: CLINIC | Age: 63
End: 2023-11-07

## 2023-11-07 VITALS
BODY MASS INDEX: 26.24 KG/M2 | SYSTOLIC BLOOD PRESSURE: 118 MMHG | OXYGEN SATURATION: 98 % | WEIGHT: 151.81 LBS | DIASTOLIC BLOOD PRESSURE: 60 MMHG | HEIGHT: 63.78 IN | RESPIRATION RATE: 16 BRPM | TEMPERATURE: 98 F | HEART RATE: 82 BPM

## 2023-11-07 DIAGNOSIS — Z23 NEED FOR INFLUENZA VACCINATION: ICD-10-CM

## 2023-11-07 DIAGNOSIS — Z12.31 BREAST CANCER SCREENING BY MAMMOGRAM: ICD-10-CM

## 2023-11-07 DIAGNOSIS — Z12.4 CERVICAL CANCER SCREENING: ICD-10-CM

## 2023-11-07 DIAGNOSIS — Z00.01 ENCOUNTER FOR GENERAL ADULT MEDICAL EXAMINATION WITH ABNORMAL FINDINGS: Primary | ICD-10-CM

## 2023-11-07 DIAGNOSIS — E55.9 VITAMIN D DEFICIENCY: ICD-10-CM

## 2023-11-07 DIAGNOSIS — R35.0 URINARY FREQUENCY: ICD-10-CM

## 2023-11-07 DIAGNOSIS — E78.2 MIXED HYPERLIPIDEMIA: ICD-10-CM

## 2023-11-07 DIAGNOSIS — M54.50 ACUTE MIDLINE LOW BACK PAIN WITHOUT SCIATICA: ICD-10-CM

## 2023-11-07 DIAGNOSIS — G47.33 OBSTRUCTIVE SLEEP APNEA: ICD-10-CM

## 2023-11-07 LAB
BILIRUBIN: NEGATIVE
GLUCOSE (URINE DIPSTICK): NEGATIVE MG/DL
KETONES (URINE DIPSTICK): NEGATIVE MG/DL
MULTISTIX EXPIRATION DATE: ABNORMAL DATE
MULTISTIX LOT#: ABNORMAL NUMERIC
NITRITE, URINE: NEGATIVE
OCCULT BLOOD: NEGATIVE
PH, URINE: 6 (ref 4.5–8)
PROTEIN (URINE DIPSTICK): 30 MG/DL
SPECIFIC GRAVITY: >=1.03 (ref 1–1.03)
URINE-COLOR: YELLOW
UROBILINOGEN,SEMI-QN: 0.2 MG/DL (ref 0–1.9)

## 2023-11-07 PROCEDURE — 99213 OFFICE O/P EST LOW 20 MIN: CPT | Performed by: FAMILY MEDICINE

## 2023-11-07 PROCEDURE — 90686 IIV4 VACC NO PRSV 0.5 ML IM: CPT | Performed by: FAMILY MEDICINE

## 2023-11-07 PROCEDURE — 87624 HPV HI-RISK TYP POOLED RSLT: CPT | Performed by: FAMILY MEDICINE

## 2023-11-07 PROCEDURE — 81003 URINALYSIS AUTO W/O SCOPE: CPT | Performed by: FAMILY MEDICINE

## 2023-11-07 PROCEDURE — 3008F BODY MASS INDEX DOCD: CPT | Performed by: FAMILY MEDICINE

## 2023-11-07 PROCEDURE — 90471 IMMUNIZATION ADMIN: CPT | Performed by: FAMILY MEDICINE

## 2023-11-07 PROCEDURE — 3074F SYST BP LT 130 MM HG: CPT | Performed by: FAMILY MEDICINE

## 2023-11-07 PROCEDURE — 3078F DIAST BP <80 MM HG: CPT | Performed by: FAMILY MEDICINE

## 2023-11-07 PROCEDURE — 99396 PREV VISIT EST AGE 40-64: CPT | Performed by: FAMILY MEDICINE

## 2023-11-07 NOTE — TELEPHONE ENCOUNTER
Pt stated that this morning she started to have some middle back pain the pain is a 5/10. The pain is constant. Per pt not sure if its her kidneys. Pt does not have a fever, visible blood in the urine or pain on urinating. Per pt she does have urine frequency but this is not new for her. Pt was inform she will need to come in for a evaluation. Pt agreed and she will see Dr. Jennifer Massey today.      Future Appointments   Date Time Provider [facility]   11/7/2023 12:30 PM Renata Herrera MD [facility] FP [facility]   1/4/2024  5:00 PM Emperatriz Cobb DO [facility] FP [facility] OP       Reason for Disposition   Age > 48 and no history of prior similar back pain    Protocols used: Back Pain-A-OH

## 2023-11-08 LAB — HPV I/H RISK 1 DNA SPEC QL NAA+PROBE: NEGATIVE

## 2023-11-09 ENCOUNTER — LAB ENCOUNTER (OUTPATIENT)
Dept: LAB | Facility: REFERENCE LAB | Age: 63
End: 2023-11-09
Attending: FAMILY MEDICINE
Payer: COMMERCIAL

## 2023-11-09 DIAGNOSIS — E55.9 VITAMIN D DEFICIENCY: ICD-10-CM

## 2023-11-09 DIAGNOSIS — Z00.01 ENCOUNTER FOR GENERAL ADULT MEDICAL EXAMINATION WITH ABNORMAL FINDINGS: ICD-10-CM

## 2023-11-09 PROBLEM — R73.03 PREDIABETES: Status: ACTIVE | Noted: 2023-11-09

## 2023-11-09 LAB
ANION GAP SERPL CALC-SCNC: 5 MMOL/L (ref 0–18)
BASOPHILS # BLD AUTO: 0.03 X10(3) UL (ref 0–0.2)
BASOPHILS NFR BLD AUTO: 0.7 %
BUN BLD-MCNC: 19 MG/DL (ref 9–23)
BUN/CREAT SERPL: 22.4 (ref 10–20)
CALCIUM BLD-MCNC: 9.5 MG/DL (ref 8.7–10.4)
CHLORIDE SERPL-SCNC: 104 MMOL/L (ref 98–112)
CHOLEST SERPL-MCNC: 247 MG/DL (ref ?–200)
CO2 SERPL-SCNC: 28 MMOL/L (ref 21–32)
CREAT BLD-MCNC: 0.85 MG/DL
DEPRECATED RDW RBC AUTO: 41.1 FL (ref 35.1–46.3)
EGFRCR SERPLBLD CKD-EPI 2021: 78 ML/MIN/1.73M2 (ref 60–?)
EOSINOPHIL # BLD AUTO: 0.15 X10(3) UL (ref 0–0.7)
EOSINOPHIL NFR BLD AUTO: 3.3 %
ERYTHROCYTE [DISTWIDTH] IN BLOOD BY AUTOMATED COUNT: 12.2 % (ref 11–15)
EST. AVERAGE GLUCOSE BLD GHB EST-MCNC: 117 MG/DL (ref 68–126)
FASTING PATIENT LIPID ANSWER: YES
FASTING STATUS PATIENT QL REPORTED: YES
GLUCOSE BLD-MCNC: 87 MG/DL (ref 70–99)
HBA1C MFR BLD: 5.7 % (ref ?–5.7)
HCT VFR BLD AUTO: 41.3 %
HDLC SERPL-MCNC: 86 MG/DL (ref 40–59)
HGB BLD-MCNC: 13.6 G/DL
IMM GRANULOCYTES # BLD AUTO: 0 X10(3) UL (ref 0–1)
IMM GRANULOCYTES NFR BLD: 0 %
LDLC SERPL CALC-MCNC: 151 MG/DL (ref ?–100)
LYMPHOCYTES # BLD AUTO: 1.34 X10(3) UL (ref 1–4)
LYMPHOCYTES NFR BLD AUTO: 29.8 %
MCH RBC QN AUTO: 30.2 PG (ref 26–34)
MCHC RBC AUTO-ENTMCNC: 32.9 G/DL (ref 31–37)
MCV RBC AUTO: 91.6 FL
MONOCYTES # BLD AUTO: 0.42 X10(3) UL (ref 0.1–1)
MONOCYTES NFR BLD AUTO: 9.4 %
NEUTROPHILS # BLD AUTO: 2.55 X10 (3) UL (ref 1.5–7.7)
NEUTROPHILS # BLD AUTO: 2.55 X10(3) UL (ref 1.5–7.7)
NEUTROPHILS NFR BLD AUTO: 56.8 %
NONHDLC SERPL-MCNC: 161 MG/DL (ref ?–130)
OSMOLALITY SERPL CALC.SUM OF ELEC: 286 MOSM/KG (ref 275–295)
PLATELET # BLD AUTO: 228 10(3)UL (ref 150–450)
POTASSIUM SERPL-SCNC: 3.8 MMOL/L (ref 3.5–5.1)
RBC # BLD AUTO: 4.51 X10(6)UL
SODIUM SERPL-SCNC: 137 MMOL/L (ref 136–145)
TRIGL SERPL-MCNC: 60 MG/DL (ref 30–149)
VIT D+METAB SERPL-MCNC: 35.6 NG/ML (ref 30–100)
VLDLC SERPL CALC-MCNC: 11 MG/DL (ref 0–30)
WBC # BLD AUTO: 4.5 X10(3) UL (ref 4–11)

## 2023-11-09 PROCEDURE — 80061 LIPID PANEL: CPT | Performed by: FAMILY MEDICINE

## 2023-11-09 PROCEDURE — 85025 COMPLETE CBC W/AUTO DIFF WBC: CPT | Performed by: FAMILY MEDICINE

## 2023-11-09 PROCEDURE — 82306 VITAMIN D 25 HYDROXY: CPT | Performed by: FAMILY MEDICINE

## 2023-11-09 PROCEDURE — 80048 BASIC METABOLIC PNL TOTAL CA: CPT | Performed by: FAMILY MEDICINE

## 2023-11-09 PROCEDURE — 83036 HEMOGLOBIN GLYCOSYLATED A1C: CPT | Performed by: FAMILY MEDICINE

## 2024-01-01 ENCOUNTER — PATIENT MESSAGE (OUTPATIENT)
Facility: CLINIC | Age: 64
End: 2024-01-01

## 2024-01-01 NOTE — TELEPHONE ENCOUNTER
From: Sergio Perez  To: Jas Guevara  Sent: 1/1/2024 11:52 AM CST  Subject: Appt on Thursday    Hi Dr. Christiano Bran! I have an appointment with you for a physical on Jan 4 however, I ended up having a last minute physical in Nov.     My question to you is - can I change the type of appointment it is? I have done something to my hip, and I'm wondering if you could evaluate it and do some osteo-adjusting for whatever is going on. I think last time I was there (can't quite remember) I had knee issues and you suggested I go to a PT to get evaluated. But I am not sure. Thanks and hope your 2024 is a-mazing!     Marie Slaughter

## 2024-01-01 NOTE — TELEPHONE ENCOUNTER
From: Jenna Galdamez  To: Wendi Chaves  Sent: 1/1/2024 12:35 PM CST  Subject: Appt on Thursday - Question was not answered    Hi - Swapna RAYGOZA's response did not answer my question as I already have an appt on Thursday, January 4.     I am wondering if I can have my back/hip evaluated, and an ostepathic adjustment, instead of a physical.    Thank you,  Jenna

## 2024-01-04 ENCOUNTER — OFFICE VISIT (OUTPATIENT)
Facility: CLINIC | Age: 64
End: 2024-01-04
Payer: COMMERCIAL

## 2024-01-04 VITALS
HEART RATE: 78 BPM | SYSTOLIC BLOOD PRESSURE: 118 MMHG | RESPIRATION RATE: 18 BRPM | OXYGEN SATURATION: 97 % | BODY MASS INDEX: 26.58 KG/M2 | HEIGHT: 63 IN | WEIGHT: 150 LBS | DIASTOLIC BLOOD PRESSURE: 56 MMHG

## 2024-01-04 DIAGNOSIS — M99.04 SOMATIC DYSFUNCTION OF SACRAL REGION: ICD-10-CM

## 2024-01-04 DIAGNOSIS — M99.03 SOMATIC DYSFUNCTION OF LUMBAR REGION: ICD-10-CM

## 2024-01-04 DIAGNOSIS — M99.05 SOMATIC DYSFUNCTION OF PELVIC REGION: ICD-10-CM

## 2024-01-04 DIAGNOSIS — M22.2X1 RIGHT PATELLOFEMORAL SYNDROME: Primary | ICD-10-CM

## 2024-01-04 DIAGNOSIS — M99.01 SOMATIC DYSFUNCTION OF CERVICAL REGION: ICD-10-CM

## 2024-01-04 PROCEDURE — 98926 OSTEOPATH MANJ 3-4 REGIONS: CPT | Performed by: FAMILY MEDICINE

## 2024-01-04 PROCEDURE — 3008F BODY MASS INDEX DOCD: CPT | Performed by: FAMILY MEDICINE

## 2024-01-04 PROCEDURE — 3074F SYST BP LT 130 MM HG: CPT | Performed by: FAMILY MEDICINE

## 2024-01-04 PROCEDURE — 3078F DIAST BP <80 MM HG: CPT | Performed by: FAMILY MEDICINE

## 2024-01-04 NOTE — PROGRESS NOTES
CC:    Chief Complaint   Patient presents with    Musculoskeletal Problem     Pt is coming in hor Hip, back, and neck pain       HPI: 63 year old female here with hip, back, and neck pain.  Woke up in November with severe, bilateral lower back pain.  She thinks it has to do with prolonged sitting in uncomfortable chairs.  Was also lifting a lot at work and lifting too much.   Then developed pain from her right hip to her buttocks, but no pain radiating down the leg.   This pain has improved.  Also has an old neck injury that was injured at the same time.  Did see a chiropractor twice, but does not think it has helped.   Has a previous whiplash injury, and thinks the pain is partly due to not stretching enough and prolonged sitting.      ROS:  General:  No fever, no fatigue, no weight changes  GI:  No N/V/D  Dermatologic:  No rashes  MSK: chronic low back, right hip, and neck pain     Past Medical History:   Diagnosis Date    Anxiety     Basal cell carcinoma ?    Depression     Fibroid     Hyperlipidemia     RAFAEL (obstructive sleep apnea)     Shingles     Sigmoiditis     Right foot    Sleep apnea     Vitamin D deficiency        Social History     Socioeconomic History    Marital status: Single     Spouse name: Not on file    Number of children: Not on file    Years of education: Not on file    Highest education level: Not on file   Occupational History    Not on file   Tobacco Use    Smoking status: Former     Packs/day: 0.00     Years: 15.00     Additional pack years: 0.00     Total pack years: 0.00     Types: Cigarettes     Quit date: 1/15/1997     Years since quittin.9     Passive exposure: Never    Smokeless tobacco: Never   Vaping Use    Vaping Use: Never used   Substance and Sexual Activity    Alcohol use: Yes     Alcohol/week: 2.0 standard drinks of alcohol     Types: 2 Standard drinks or equivalent per week     Comment: socially    Drug use: No    Sexual activity: Not on file   Other Topics Concern     Caffeine Concern Not Asked    Exercise Not Asked    Seat Belt Not Asked    Special Diet Not Asked    Stress Concern Not Asked    Weight Concern Not Asked   Social History Narrative    Not on file     Social Determinants of Health     Financial Resource Strain: Not on file   Food Insecurity: Not on file   Transportation Needs: Not on file   Physical Activity: Not on file   Stress: Not on file   Social Connections: Not on file   Housing Stability: Not on file       Current Outpatient Medications   Medication Sig Dispense Refill    lamoTRIgine 150 MG Oral Tab Take 1 tablet (150 mg total) by mouth daily.      TURMERIC OR Take 1,000 mg by mouth 2 (two) times a day. (Patient not taking: Reported on 11/7/2023)         Patient has no known allergies.      Vitals:   Vitals:    01/04/24 1655   BP: 118/56   Pulse: 78   Resp: 18   SpO2: 97%   Weight: 150 lb (68 kg)   Height: 5' 3\" (1.6 m)       Body mass index is 26.57 kg/m².    Physical:  General:  Alert, appropriate, no acute distress   Back Exam     Tenderness   The patient is experiencing tenderness in the cervical and lumbar.    Muscle Strength   The patient has normal back strength.    Other   Sensation: normal  Erythema: no back redness  Scars: absent            Assessment and Plan: 63 year old female here for OMT.    1. Right patellofemoral syndrome    - Referral to physical therapy given for management of right patellofemoral pain syndrome  - Physical Therapy Referral - External    2. Somatic dysfunction of cervical region    - OMT completed as documented in procedure note    3. Somatic dysfunction of lumbar region      4. Somatic dysfunction of sacral region      5. Somatic dysfunction of pelvic region        Wendi Chaves DO  01/04/24  5:03 PM

## 2024-01-05 NOTE — PROCEDURES
Osteopathic Manipulation Procedure Note     Head region:  Suboccipital release of head region performed with moderate improvement noted, no complications.     Cervical region:  Soft tissue of cervical paraspinal musculature performed with moderate improvement noted, no complications.  FPR of [id number] performed with moderate improvement noted and no complications     Lumbar region:  Soft tissue performed on lumbar paraspinal musculature with mild improvement noted and no complications      Sacral region:  Sacral rocking performed with moderate improvement, no complications.  Sacral base spread technique performed with moderate improvement, no complications.     Pelvic region:  Muscle energy of right anterior innominate performed with moderate improvement noted and no complications.     Patient tolerated OMT well with noted improvement at end of treatment.   Post-procedure instructions given.

## 2024-01-26 ENCOUNTER — HOSPITAL ENCOUNTER (OUTPATIENT)
Dept: MAMMOGRAPHY | Age: 64
Discharge: HOME | End: 2024-01-26
Attending: FAMILY MEDICINE
Payer: COMMERCIAL

## 2024-01-26 DIAGNOSIS — Z12.31 BREAST CANCER SCREENING BY MAMMOGRAM: ICD-10-CM

## 2024-01-26 PROCEDURE — 77067 SCR MAMMO BI INCL CAD: CPT | Performed by: FAMILY MEDICINE

## 2024-01-26 PROCEDURE — 77063 BREAST TOMOSYNTHESIS BI: CPT | Performed by: FAMILY MEDICINE

## 2024-06-18 ENCOUNTER — OFFICE VISIT (OUTPATIENT)
Facility: CLINIC | Age: 64
End: 2024-06-18

## 2024-06-18 ENCOUNTER — NURSE TRIAGE (OUTPATIENT)
Facility: CLINIC | Age: 64
End: 2024-06-18

## 2024-06-18 VITALS
BODY MASS INDEX: 25.87 KG/M2 | WEIGHT: 146 LBS | SYSTOLIC BLOOD PRESSURE: 112 MMHG | HEART RATE: 70 BPM | DIASTOLIC BLOOD PRESSURE: 68 MMHG | OXYGEN SATURATION: 97 % | HEIGHT: 63 IN

## 2024-06-18 DIAGNOSIS — R68.2 DRY MOUTH: Primary | ICD-10-CM

## 2024-06-18 PROCEDURE — 99214 OFFICE O/P EST MOD 30 MIN: CPT | Performed by: FAMILY MEDICINE

## 2024-06-18 PROCEDURE — 3074F SYST BP LT 130 MM HG: CPT | Performed by: FAMILY MEDICINE

## 2024-06-18 PROCEDURE — 3008F BODY MASS INDEX DOCD: CPT | Performed by: FAMILY MEDICINE

## 2024-06-18 PROCEDURE — 3078F DIAST BP <80 MM HG: CPT | Performed by: FAMILY MEDICINE

## 2024-06-18 RX ORDER — CLOTRIMAZOLE 10 MG/1
10 LOZENGE ORAL; TOPICAL
Qty: 70 LOZENGE | Refills: 0 | Status: SHIPPED | OUTPATIENT
Start: 2024-06-18 | End: 2024-07-02

## 2024-06-18 NOTE — PROGRESS NOTES
HPI:    Patient ID: Jenna Galdamez is a 63 year old female who presents for possible thrush.    HPI  Thinks she has thrush.  Has had dental work. Was on amoxicillin for 5 weeks and completed course about one month ago.   Started to have other oral symptoms 2 weeks ago. Woke up with mouth really dry. Taste buds are affected. Feels burning around mouth. Today, she noticed bumps on the back of her tongue.   No coughing, vomiting, choking, or fevers.   The dry mouth is very significant.   Always tired and has been worse over past 2 weeks.  No other associated symptoms.   Supposed to use CPAP, but doesn't.     Past Medical History:    Anxiety    Basal cell carcinoma    Depression    Fibroid    Hyperlipidemia    RAFAEL (obstructive sleep apnea)    Shingles    Sigmoiditis    Right foot    Sleep apnea    Vitamin D deficiency        Current Outpatient Medications   Medication Sig Dispense Refill    clotrimazole 10 MG Mouth/Throat Leilani Take 1 lozenge (10 mg total) by mouth 5 (five) times daily for 14 days. 70 lozenge 0    TURMERIC OR Take 1,000 mg by mouth 2 (two) times a day. (Patient not taking: Reported on 11/7/2023)      lamoTRIgine 150 MG Oral Tab Take 1 tablet (150 mg total) by mouth daily.          No Known Allergies    Review of Systems   Constitutional:  Positive for fatigue.   HENT:          See HPI. Otherwise negative.    Respiratory: Negative.     Cardiovascular: Negative.    Gastrointestinal: Negative.    Neurological: Negative.    All other systems reviewed and are negative.           /68   Pulse 70   Ht 5' 3\" (1.6 m)   Wt 146 lb (66.2 kg)   LMP  (LMP Unknown)   SpO2 97%   BMI 25.86 kg/m²     PHYSICAL EXAM:   Physical Exam  Constitutional:       General: She is not in acute distress.     Appearance: Normal appearance. She is well-developed. She is not ill-appearing, toxic-appearing or diaphoretic.   HENT:      Head: Normocephalic and atraumatic.      Right Ear: Tympanic membrane, ear canal and  external ear normal.      Left Ear: Tympanic membrane, ear canal and external ear normal.      Nose: Nose normal.      Mouth/Throat:      Mouth: Mucous membranes are moist.      Pharynx: No posterior oropharyngeal erythema.      Comments: 1-2mm white spec/papule noted on right posterior oropharynx. No other plaques noted.   Eyes:      Extraocular Movements: Extraocular movements intact.      Conjunctiva/sclera: Conjunctivae normal.   Cardiovascular:      Rate and Rhythm: Normal rate and regular rhythm.      Pulses: Normal pulses.      Heart sounds: Normal heart sounds. No murmur heard.     No friction rub. No gallop.   Pulmonary:      Effort: Pulmonary effort is normal. No respiratory distress.      Breath sounds: Normal breath sounds. No wheezing or rales.   Musculoskeletal:      Cervical back: Neck supple.      Right lower leg: No edema.      Left lower leg: No edema.   Lymphadenopathy:      Cervical: No cervical adenopathy.   Skin:     General: Skin is warm and dry.      Capillary Refill: Capillary refill takes less than 2 seconds.   Neurological:      General: No focal deficit present.      Mental Status: She is alert.   Psychiatric:         Mood and Affect: Mood normal.         Behavior: Behavior normal.         Thought Content: Thought content normal.         Judgment: Judgment normal.             ASSESSMENT/PLAN:     Encounter Diagnosis   Name Primary?    Dry mouth Yes       1. Dry mouth     -Possibly thrush.   -Reviewed prior labs, including A1c 5.7% in 11/2023.   -Will treat with clotrimazole leilani for up to 14 days. If fails to resolve, will consider oral therapy versus further w/u. If resolves, will consider resuming clotrimazole leilani TID for ppx with upcoming extended abx course per dentist.   -Return precautions given.     Meds This Visit:  Requested Prescriptions     Signed Prescriptions Disp Refills    clotrimazole 10 MG Mouth/Throat Leilani 70 lozenge 0     Sig: Take 1 lozenge (10 mg total) by  mouth 5 (five) times daily for 14 days.       Imaging & Referrals:  None       Jose Pineda, DO  ID#[id number]

## 2024-06-18 NOTE — TELEPHONE ENCOUNTER
Action Requested: Summary for Provider     []  Critical Lab, Recommendations Needed  [] Need Additional Advice  []   FYI    []   Need Orders  [] Need Medications Sent to Pharmacy  []  Other     SUMMARY: appointment made , patient stated she have been on antibiotic, 2 weeks noticed , taste change, burning of mouth, mouth extremely dry, bumps on back of mouth    Reason for call: Thrush  Onset:2 weeks                   Reason for Disposition   White patches that stick to tongue or inner cheek, which can be wiped off    Protocols used: Mouth Symptoms-A-OH

## 2024-06-24 ENCOUNTER — NURSE TRIAGE (OUTPATIENT)
Facility: CLINIC | Age: 64
End: 2024-06-24

## 2024-06-24 ENCOUNTER — PATIENT MESSAGE (OUTPATIENT)
Facility: CLINIC | Age: 64
End: 2024-06-24

## 2024-06-24 DIAGNOSIS — R79.9 ELEVATED BUN: Primary | ICD-10-CM

## 2024-06-24 DIAGNOSIS — R53.83 FATIGUE, UNSPECIFIED TYPE: ICD-10-CM

## 2024-06-24 DIAGNOSIS — G47.33 OBSTRUCTIVE SLEEP APNEA: ICD-10-CM

## 2024-06-24 DIAGNOSIS — E78.2 MIXED HYPERLIPIDEMIA: ICD-10-CM

## 2024-06-24 DIAGNOSIS — R73.03 PREDIABETES: ICD-10-CM

## 2024-06-24 DIAGNOSIS — E55.9 VITAMIN D DEFICIENCY: ICD-10-CM

## 2024-06-24 DIAGNOSIS — R06.02 SHORTNESS OF BREATH: ICD-10-CM

## 2024-06-24 NOTE — TELEPHONE ENCOUNTER
Advised patient of Dr. Chaves's note and number given to pulmonology and scheduling. Patient verbalized understanding.

## 2024-06-24 NOTE — TELEPHONE ENCOUNTER
Kalyani Carranza, RN 6/24/2024 4:49 PM CDT        ----- Message -----  From: Jenna Galdamez  Sent: 6/24/2024 4:48 PM CDT  To: Em Triage Support  Subject: RX for some tests     Hi Dr. Chaves!    Hope you are doing well since I last saw you.     I just got off zoom with my psychiatrist and told her about these goofy symptoms I am having (trouble sleeping, fatigue and extremely dry mouth) and she is going to send me an RX for bloodwork. Can I get the same blood panel test I had last time at your office (and whatever she may have added to the list) [place]?    Secondly, and this may be paranoid on my part, but here it is. This could be due to poor sleeping, but I am experiencing extreme fatigue and occasional pressure on my chest and a bit of shortness of breath. But I do not have high blood pressure, as it was checked last week when I saw Dr. Pineda. I had that event some years ago where I did have high blood pressure. I am wondering if I should get an EKG, just to be on the safe side. Or some basic test for my heart, whatever the right test would be.     The side issue I just thought of is that I have sleep apnea and have still not been able to adjust to a machine/mask, so the quality of my sleep is not great. So that of course, impacts everything in the mix here.     My best guess is that I am fine, and it is anxiety/insomnia/sleep apnea. But I'm just a bit worried about it. The extreme dry mouth and lack of taste alerted my psychiatrist to suggest bloodwork.     Thanks for reading my long email!!   Jenna

## 2024-06-24 NOTE — TELEPHONE ENCOUNTER
Action Requested: Summary for Provider     []  Critical Lab, Recommendations Needed  [] Need Additional Advice  []   FYI    [x]   Need Orders  [] Need Medications Sent to Pharmacy  []  Other     SUMMARY: patient sent a my chart message copied below. Upon speaking with her she advised that she's having \"insomnia and can't wear her cpap due to the face that she can't adjust the mask. She is experiencing some shortness of breath and feel's it may be due to lack of sleep and feels like she has pressure to her chest at times but feel's it may be related to anxiety.\" She saw Dr. Pineda on 6/18/24. I asked her to explain what blood test's she is asking for. She advised whatever her psychiatrist send's (is not sure what tests will be ordered) so she is asking for repeat blood test's from the one's she completed on 11/9/24. Also asking for EKG.    Pended all for review.        Reason for call: Chest Pain Angina  Onset: Data Unavailable                   Reason for Disposition   Chest pain(s) lasting a few seconds    Protocols used: Chest Pain-A-[place]    Kalyani Carranza RN 6/24/2024  4:49 PM CDT        ----- Message -----  From: Jenna Galdamez  Sent: 6/24/2024   4:48 PM CDT  To: Em Triage Support  Subject: RX for some tests                                Hi Dr. Chaves!    Hope you are doing well since I last saw you.    I just got off zoom with my psychiatrist and told her about these goofy symptoms I am having (trouble sleeping, fatigue and extremely dry mouth) and she is going to send me an RX for bloodwork. Can I get the same blood panel test I had last time at your office (and whatever she may have added to the list) [place]?    Secondly, and this may be paranoid on my part, but here it is. This could be due to poor sleeping, but I am experiencing extreme fatigue and occasional pressure on my chest and a bit of shortness of breath. But I do not have high blood pressure, as it was checked last week when I  saw Dr. Pineda. I had that event some years ago where I did have high blood pressure. I am wondering if I should get an EKG, just to be on the safe side.  Or some basic test for my heart, whatever the right test would be.    The side issue I just thought of is that I have sleep apnea and have still not been able to adjust to a machine/mask, so the quality of my sleep is not great. So that of course, impacts everything in the mix here.    My best guess is that I am fine, and it is anxiety/insomnia/sleep apnea. But I'm just a bit worried about it.  The extreme dry mouth and lack of taste alerted my psychiatrist to suggest bloodwork.    Thanks for reading my long email!!  Jenna

## 2024-06-24 NOTE — TELEPHONE ENCOUNTER
Lab orders and EKG placed, but also encourage her to see a sleep medicine doctor to discuss management of sleep apnea as that is likely contributing to symptoms.

## 2024-06-26 ENCOUNTER — EKG ENCOUNTER (OUTPATIENT)
Dept: LAB | Age: 64
End: 2024-06-26
Attending: FAMILY MEDICINE

## 2024-06-26 DIAGNOSIS — R53.83 FATIGUE, UNSPECIFIED TYPE: ICD-10-CM

## 2024-06-26 DIAGNOSIS — R79.9 ELEVATED BUN: ICD-10-CM

## 2024-06-26 DIAGNOSIS — R73.03 PREDIABETES: ICD-10-CM

## 2024-06-26 DIAGNOSIS — E78.2 MIXED HYPERLIPIDEMIA: ICD-10-CM

## 2024-06-26 DIAGNOSIS — R06.02 SHORTNESS OF BREATH: Primary | ICD-10-CM

## 2024-06-26 LAB
ANION GAP SERPL CALC-SCNC: 5 MMOL/L (ref 0–18)
ATRIAL RATE: 69 BPM
BASOPHILS # BLD AUTO: 0.04 X10(3) UL (ref 0–0.2)
BASOPHILS NFR BLD AUTO: 1 %
BUN BLD-MCNC: 10 MG/DL (ref 9–23)
BUN/CREAT SERPL: 12.2 (ref 10–20)
CALCIUM BLD-MCNC: 9.4 MG/DL (ref 8.7–10.4)
CHLORIDE SERPL-SCNC: 109 MMOL/L (ref 98–112)
CHOLEST SERPL-MCNC: 266 MG/DL (ref ?–200)
CO2 SERPL-SCNC: 27 MMOL/L (ref 21–32)
CREAT BLD-MCNC: 0.82 MG/DL
DEPRECATED RDW RBC AUTO: 42.5 FL (ref 35.1–46.3)
EGFRCR SERPLBLD CKD-EPI 2021: 81 ML/MIN/1.73M2 (ref 60–?)
EOSINOPHIL # BLD AUTO: 0.14 X10(3) UL (ref 0–0.7)
EOSINOPHIL NFR BLD AUTO: 3.5 %
ERYTHROCYTE [DISTWIDTH] IN BLOOD BY AUTOMATED COUNT: 12.6 % (ref 11–15)
EST. AVERAGE GLUCOSE BLD GHB EST-MCNC: 111 MG/DL (ref 68–126)
FASTING PATIENT LIPID ANSWER: YES
FASTING STATUS PATIENT QL REPORTED: YES
GLUCOSE BLD-MCNC: 90 MG/DL (ref 70–99)
HBA1C MFR BLD: 5.5 % (ref ?–5.7)
HCT VFR BLD AUTO: 39 %
HDLC SERPL-MCNC: 84 MG/DL (ref 40–59)
HGB BLD-MCNC: 13.2 G/DL
IMM GRANULOCYTES # BLD AUTO: 0.01 X10(3) UL (ref 0–1)
IMM GRANULOCYTES NFR BLD: 0.3 %
LDLC SERPL CALC-MCNC: 167 MG/DL (ref ?–100)
LYMPHOCYTES # BLD AUTO: 1.51 X10(3) UL (ref 1–4)
LYMPHOCYTES NFR BLD AUTO: 37.8 %
MCH RBC QN AUTO: 31.3 PG (ref 26–34)
MCHC RBC AUTO-ENTMCNC: 33.8 G/DL (ref 31–37)
MCV RBC AUTO: 92.4 FL
MONOCYTES # BLD AUTO: 0.31 X10(3) UL (ref 0.1–1)
MONOCYTES NFR BLD AUTO: 7.8 %
NEUTROPHILS # BLD AUTO: 1.99 X10 (3) UL (ref 1.5–7.7)
NEUTROPHILS # BLD AUTO: 1.99 X10(3) UL (ref 1.5–7.7)
NEUTROPHILS NFR BLD AUTO: 49.6 %
NONHDLC SERPL-MCNC: 182 MG/DL (ref ?–130)
OSMOLALITY SERPL CALC.SUM OF ELEC: 291 MOSM/KG (ref 275–295)
P AXIS: 53 DEGREES
P-R INTERVAL: 146 MS
PLATELET # BLD AUTO: 215 10(3)UL (ref 150–450)
POTASSIUM SERPL-SCNC: 4 MMOL/L (ref 3.5–5.1)
Q-T INTERVAL: 374 MS
QRS DURATION: 88 MS
QTC CALCULATION (BEZET): 400 MS
R AXIS: -7 DEGREES
RBC # BLD AUTO: 4.22 X10(6)UL
SODIUM SERPL-SCNC: 141 MMOL/L (ref 136–145)
T AXIS: 64 DEGREES
TRIGL SERPL-MCNC: 91 MG/DL (ref 30–149)
TSI SER-ACNC: 3.73 MIU/ML (ref 0.55–4.78)
VENTRICULAR RATE: 69 BPM
VIT D+METAB SERPL-MCNC: 31.3 NG/ML (ref 30–100)
VLDLC SERPL CALC-MCNC: 18 MG/DL (ref 0–30)
WBC # BLD AUTO: 4 X10(3) UL (ref 4–11)

## 2024-06-26 PROCEDURE — 80048 BASIC METABOLIC PNL TOTAL CA: CPT | Performed by: FAMILY MEDICINE

## 2024-06-26 PROCEDURE — 83036 HEMOGLOBIN GLYCOSYLATED A1C: CPT | Performed by: FAMILY MEDICINE

## 2024-06-26 PROCEDURE — 84443 ASSAY THYROID STIM HORMONE: CPT | Performed by: FAMILY MEDICINE

## 2024-06-26 PROCEDURE — 85025 COMPLETE CBC W/AUTO DIFF WBC: CPT | Performed by: FAMILY MEDICINE

## 2024-06-26 PROCEDURE — 82306 VITAMIN D 25 HYDROXY: CPT | Performed by: FAMILY MEDICINE

## 2024-06-26 PROCEDURE — 80061 LIPID PANEL: CPT | Performed by: FAMILY MEDICINE

## 2024-07-01 ENCOUNTER — TELEPHONE (OUTPATIENT)
Facility: CLINIC | Age: 64
End: 2024-07-01

## 2024-07-08 ENCOUNTER — HOSPITAL ENCOUNTER (OUTPATIENT)
Dept: CV DIAGNOSTICS | Facility: HOSPITAL | Age: 64
Discharge: HOME | End: 2024-07-08
Attending: FAMILY MEDICINE
Payer: COMMERCIAL

## 2024-07-08 DIAGNOSIS — R94.31 ABNORMAL EKG: ICD-10-CM

## 2024-07-08 PROCEDURE — 93306 TTE W/DOPPLER COMPLETE: CPT | Performed by: FAMILY MEDICINE

## 2024-07-22 ENCOUNTER — ORDER TRANSCRIPTION (OUTPATIENT)
Dept: ADMINISTRATIVE | Facility: HOSPITAL | Age: 64
End: 2024-07-22

## 2024-07-22 DIAGNOSIS — R06.09 DYSPNEA ON EXERTION: Primary | ICD-10-CM

## 2024-07-25 ENCOUNTER — HOSPITAL ENCOUNTER (OUTPATIENT)
Dept: GENERAL RADIOLOGY | Age: 64
Discharge: HOME | End: 2024-07-25
Payer: COMMERCIAL

## 2024-07-25 DIAGNOSIS — R06.09 DYSPNEA ON EXERTION: ICD-10-CM

## 2024-07-25 PROCEDURE — 71046 X-RAY EXAM CHEST 2 VIEWS: CPT

## 2025-01-20 ENCOUNTER — NURSE TRIAGE (OUTPATIENT)
Facility: CLINIC | Age: 65
End: 2025-01-20

## 2025-01-20 NOTE — TELEPHONE ENCOUNTER
Action Requested: Summary for Provider     []  Critical Lab, Recommendations Needed  [] Need Additional Advice  []   FYI    []   Need Orders  [] Need Medications Sent to Pharmacy  []  Other     SUMMARY: c/o pain in her right breast started about 5 days ago. No other symptoms or complications - scheduled appointment for 01/27.     Reason for call: Breast Pain  Onset: 5 days    Patient called stating she developed right breast pain that started about 5 days ago. she has a history of fibrocystic breasts. She states the pain happens more when she bends over, the pain is on the right side of her left breast, between the nipple and the base of her breast closer to her armpit. No swelling, rash or redness, no fevers or warm breast to the touch. She states when she is not doing anything she barely notices the pain but she she starts to move around she feels like a pulling / sharp / burning pain. Per protocol, patient should be seen in the office within 2 weeks. Scheduled appointment for 01/27.     Future Appointments   Date Time Provider Department Center   1/27/2025  2:00 PM Jose Pineda DO [facility]         Reason for Disposition   Breast pain and cause is not known    Protocols used: Breast Symptoms-A-[place]

## 2025-01-27 ENCOUNTER — OFFICE VISIT (OUTPATIENT)
Facility: CLINIC | Age: 65
End: 2025-01-27
Payer: COMMERCIAL

## 2025-01-27 VITALS
DIASTOLIC BLOOD PRESSURE: 78 MMHG | SYSTOLIC BLOOD PRESSURE: 124 MMHG | HEART RATE: 76 BPM | BODY MASS INDEX: 26.58 KG/M2 | OXYGEN SATURATION: 98 % | HEIGHT: 63 IN | WEIGHT: 150 LBS

## 2025-01-27 DIAGNOSIS — K52.9 CHRONIC DIARRHEA: ICD-10-CM

## 2025-01-27 DIAGNOSIS — N64.4 BREAST PAIN, RIGHT: Primary | ICD-10-CM

## 2025-01-27 PROCEDURE — 3074F SYST BP LT 130 MM HG: CPT | Performed by: FAMILY MEDICINE

## 2025-01-27 PROCEDURE — 3078F DIAST BP <80 MM HG: CPT | Performed by: FAMILY MEDICINE

## 2025-01-27 PROCEDURE — 99214 OFFICE O/P EST MOD 30 MIN: CPT | Performed by: FAMILY MEDICINE

## 2025-01-27 PROCEDURE — 3008F BODY MASS INDEX DOCD: CPT | Performed by: FAMILY MEDICINE

## 2025-01-27 NOTE — PROGRESS NOTES
HPI:    Patient ID: Jenna Galdamez is a 64 year old female who presents for right breast pain and loose stool.    HPI  Right breast pain:  Has chronic lumpy breasts.   Felt pain on lateral right breast 10-14 days ago when bending over and reaching with her right hand.   Kind of feels like the tenderness she used to have prior to her period.  No other associated symptoms.    No nipple discharge, skin dimpling, fevers, night sweats, or weight loss.     Change in stool:   Loose stool since 10/2024.   Started when she returned from [place]. [place]   Never any blood in stool.  Has BM usually in morning. Usually 1-3x/day.   Occasionally has normal stool but rare.   No abdominal pain or N/V.   No other associated symptoms.     Past Medical History:    Anxiety    Basal cell carcinoma    Depression    Fibroid    Hyperlipidemia    RAFAEL (obstructive sleep apnea)    Shingles    Sigmoiditis    Right foot    Sleep apnea    Vitamin D deficiency        Current Outpatient Medications   Medication Sig Dispense Refill    lamoTRIgine 150 MG Oral Tab Take 1 tablet (150 mg total) by mouth daily.          Allergies[1]    Review of Systems   Constitutional: Negative.    Respiratory: Negative.     Cardiovascular: Negative.    Gastrointestinal:  Positive for diarrhea. Negative for abdominal distention, abdominal pain, anal bleeding, blood in stool, constipation, nausea and vomiting.   Neurological: Negative.    All other systems reviewed and are negative.           /78   Pulse 76   Ht 5' 3\" (1.6 m)   Wt 150 lb (68 kg)   SpO2 98%   BMI 26.57 kg/m²     PHYSICAL EXAM:   Physical Exam  Exam conducted with a chaperone present (Brianna Henning MA).   Constitutional:       General: She is not in acute distress.     Appearance: Normal appearance. She is not ill-appearing, toxic-appearing or diaphoretic.   Pulmonary:      Effort: Pulmonary effort is normal.   Chest:   Breasts:     Breasts are  symmetrical.      Right: Tenderness (minimally ttp at 9 o'clock position just lateral to areolar border) present. No swelling, bleeding, inverted nipple, mass, nipple discharge or skin change.      Left: No swelling, bleeding, inverted nipple, mass, nipple discharge, skin change or tenderness.   Neurological:      General: No focal deficit present.      Mental Status: She is alert.   Psychiatric:         Mood and Affect: Mood normal.         Behavior: Behavior normal.         Thought Content: Thought content normal.         Judgment: Judgment normal.             ASSESSMENT/PLAN:     Encounter Diagnoses   Name Primary?    Breast pain, right Yes    Chronic diarrhea        1. Breast pain, right  - JAMAAL FLORIDALMA 2D+3D DIAGNOSTIC JAMAAL  BILAT (CPT=77066/46217); Future  - US BREAST RIGHT COMPLETE (CPT=76641); Future  -Reviewed negative screening mammogram from 01/2024.  -Due for screening, although will check b/l diagnostic mammogram with right breast ultrasound.     2. Chronic diarrhea  - Gastro Referral - [facility])  - Norovirus PCR, Stool [E]; Future  - Stool Culture w/Shigatoxin [E]; Future  - Giardia + Crypto Antigen, Stool [E]; Future  - C. diff toxigenic PCR (OPT) [E]; Future     -Check stool testing given chronicity, and specifically to r/o Giardia.   -If negative, plan to schedule repeat colonoscopy. Of note, last colonoscopy was in 05/2018 with recs to repeat in 9 years per patient.   -Further recs pending results.     Meds This Visit:  Requested Prescriptions      No prescriptions requested or ordered in this encounter       Imaging & Referrals:  GASTRO - INTERNAL  JAMAAL FLORIDALMA 2D+3D DIAGNOSTIC JAMAAL  BILAT (CPT=77066/40268)  US BREAST RIGHT COMPLETE (YUX=[id number])       Jose Pineda DO  ID#[id number]       [1] No Known Allergies

## 2025-02-20 ENCOUNTER — HOSPITAL ENCOUNTER (OUTPATIENT)
Dept: MAMMOGRAPHY | Facility: HOSPITAL | Age: 65
Discharge: HOME | End: 2025-02-20
Attending: FAMILY MEDICINE
Payer: COMMERCIAL

## 2025-02-20 DIAGNOSIS — N64.4 BREAST PAIN, RIGHT: ICD-10-CM

## 2025-02-20 PROCEDURE — 77062 BREAST TOMOSYNTHESIS BI: CPT | Performed by: FAMILY MEDICINE

## 2025-02-20 PROCEDURE — 77066 DX MAMMO INCL CAD BI: CPT | Performed by: FAMILY MEDICINE

## (undated) NOTE — LETTER
[address]  [address] [facility] [address]  Dept: [phone]      September 1, 2017    Patient: Fran Long   Date of Visit: 8/29/2017       To Whom It May Concern:    Fran Long was seen and treated in our

## (undated) NOTE — LETTER
Date: 11/7/2023    Patient Name: Maya Bran          To Whom it may concern: This letter has been written at the patient's request. The above patient was seen at the [facility] for treatment of a medical condition. This patient was diagnosed with musculoskeletal back pain. Please allow/provide patient an ergonomic chair to avoid flaring this medical condition.           Sincerely,    Moise Salas MD